# Patient Record
Sex: MALE | Race: BLACK OR AFRICAN AMERICAN | NOT HISPANIC OR LATINO
[De-identification: names, ages, dates, MRNs, and addresses within clinical notes are randomized per-mention and may not be internally consistent; named-entity substitution may affect disease eponyms.]

---

## 2023-03-27 ENCOUNTER — NON-APPOINTMENT (OUTPATIENT)
Age: 48
End: 2023-03-27

## 2023-03-29 ENCOUNTER — APPOINTMENT (OUTPATIENT)
Dept: OPHTHALMOLOGY | Facility: CLINIC | Age: 48
End: 2023-03-29

## 2023-03-31 ENCOUNTER — NON-APPOINTMENT (OUTPATIENT)
Age: 48
End: 2023-03-31

## 2023-04-08 ENCOUNTER — EMERGENCY (EMERGENCY)
Facility: HOSPITAL | Age: 48
LOS: 1 days | Discharge: HOME | End: 2023-04-08
Attending: STUDENT IN AN ORGANIZED HEALTH CARE EDUCATION/TRAINING PROGRAM | Admitting: STUDENT IN AN ORGANIZED HEALTH CARE EDUCATION/TRAINING PROGRAM
Payer: COMMERCIAL

## 2023-04-08 VITALS
RESPIRATION RATE: 18 BRPM | OXYGEN SATURATION: 100 % | SYSTOLIC BLOOD PRESSURE: 151 MMHG | HEART RATE: 107 BPM | TEMPERATURE: 98 F | DIASTOLIC BLOOD PRESSURE: 105 MMHG

## 2023-04-08 PROCEDURE — 99284 EMERGENCY DEPT VISIT MOD MDM: CPT

## 2023-04-08 NOTE — ED PROVIDER NOTE - OBJECTIVE STATEMENT
47-year-old male with no medical history complaining of blurry vision in both eyes for a few days.  1 week ago patient had red eyes with foreign body sensation and blurry vision. pt also rubbed his eyes and face with paper towel then abnormal discharge and swelling occurred around the eyes. denies trauma to eyes or foreign body that went into his eyes. Patient went to urgent care where he got eyedrops. swelling and redness have gone down, but pt still feels blurriness in his eyes. blurriness is better after cleaning his eyes. abnormal/cloudy discharge from eyes is worse in the morning, then improves over time.  pt went to see an eye specialist 5 days ago, was given polytrim. pt still feels no improvement. denies headache, fever, nvd, dizziness, weakness, numbness/tingling.

## 2023-04-08 NOTE — ED PROVIDER NOTE - PHYSICAL EXAMINATION
CONSTITUTIONAL: Well-appearing; well-nourished; in no apparent distress. Non-toxic appearing.   NEURO: Alert & oriented. Gait steady without assistance. Sensory and motor functions are grossly intact.  PSYCH: Mood appropriate. Thought processes intact.   NECK: Supple  CARD: Regular rate and rhythm, no murmurs  RESP: No accessory muscle use; breath sounds clear and equal bilaterally; no wheezes, rhonchi, or rales     ABD: Soft; non-distended; non-tender.   MUSCULOSKELETAL/EXTREMITIES: FROM in all four extremities; no extremity edema.  SKIN: Warm; dry; no apparent lesions or exudate  EYE: OU 20/50, OS 20/50, OD 20/50. injected conjunctiva. full EOM. PERRL. cloudy discharge in eyes B/L. +fluorescein uptake in right eye

## 2023-04-08 NOTE — ED ADULT TRIAGE NOTE - CHIEF COMPLAINT QUOTE
bi-lateral vision blurriness which has been persistent despite recent TX for conjunctivitis to the left eye. Symptom onset was about 10 days ago. No PMH

## 2023-04-08 NOTE — ED PROVIDER NOTE - NS ED ROS FT
ROS:  GENERAL: No fever, no chills  HEENT: see hpi  CARDIAC: no chest pain  PULMONARY: no cough, no shortness of breath  GI: no abdominal pain, no nausea, no vomiting, no diarrhea, no constipation  : No dysuria, no frequency, no change in appearance or odor of urine  SKIN: no rashes  NEURO: no headache, no weakness, no dizziness  MSK: No joint pain  All other systems reviewed as negative. As per HPI

## 2023-04-08 NOTE — ED PROVIDER NOTE - ATTENDING APP SHARED VISIT CONTRIBUTION OF CARE
32.0 weeks
I have personally performed a face to face medical and diagnostic evaluation of the patient. I have discussed with and reviewed the Resident's and/or ACP's and/or Medical/PA/NP student's note and agree with the History, ROS, Physical Exam and MDM unless otherwise indicated. A brief summary of my personal evaluation and impression can be found below.    47y M w/ no PMHx complaining of blurry vision in both eyes for a 4 days with associated eye crusting. States he saw optho 5 days prior and was prescribed polytrim eye drops which he has been using but denies relief. States symptoms worsen in the morning as he wakes up with eye crusting and improve after using artificial tears and rubbing away eye discharge, states his vision then improves. Denies fever, pain w/ eom, dizziness, headache, loss of vision, rash, known eye trauma. Does not wear contact or glasses.    Please see above for visual acuity findings as documented by ACP    On exam: EOMI, no pain associated, no discharge, no rash, no proptosis.    On woods lamp exam, no evidence of acute corneal abrasion. Will have patient follow-up with optho, provide artificial tears, no clinical concern for AACG, corneal abrasion, or foreign body. Likely viral vs. bacterial conjunctivitis.

## 2023-04-08 NOTE — ED PROVIDER NOTE - PATIENT PORTAL LINK FT
You can access the FollowMyHealth Patient Portal offered by [facility] by registering at the following website: [email or web address]. By joining [facility]’s FollowMyHealth portal, you will also be able to view your health information using other applications (apps) compatible with our system.

## 2023-04-08 NOTE — ED PROVIDER NOTE - CLINICAL SUMMARY MEDICAL DECISION MAKING FREE TEXT BOX
pt here for eye blurriness. no neuro complaints. opht pt here for eye blurriness. no neuro complaints. already saw ophthalmologist 6 days ago, on polytrim. exam found visual acuity 20/50 both sides, but improved after using artificial eye drop - 20/30 both sides. no fb found. conjunctiva are still both injected B/L. some fluorescein uptake on right conjunctiva. full EOM. PERRL. pt well appearing, no neurologic deficit found.   likely conjunctivitis. blurry vision caused by abnormal discharge.   will have pt continue follow up with ophthal.

## 2023-10-03 ENCOUNTER — NON-APPOINTMENT (OUTPATIENT)
Age: 48
End: 2023-10-03

## 2023-11-17 ENCOUNTER — NON-APPOINTMENT (OUTPATIENT)
Age: 48
End: 2023-11-17

## 2024-08-18 ENCOUNTER — NON-APPOINTMENT (OUTPATIENT)
Age: 49
End: 2024-08-18

## 2025-01-31 ENCOUNTER — NON-APPOINTMENT (OUTPATIENT)
Age: 50
End: 2025-01-31